# Patient Record
Sex: MALE | Race: WHITE | NOT HISPANIC OR LATINO | Employment: UNEMPLOYED | URBAN - NONMETROPOLITAN AREA
[De-identification: names, ages, dates, MRNs, and addresses within clinical notes are randomized per-mention and may not be internally consistent; named-entity substitution may affect disease eponyms.]

---

## 2021-06-03 ENCOUNTER — APPOINTMENT (OUTPATIENT)
Dept: GENERAL RADIOLOGY | Facility: HOSPITAL | Age: 51
End: 2021-06-03

## 2021-06-03 ENCOUNTER — HOSPITAL ENCOUNTER (INPATIENT)
Facility: HOSPITAL | Age: 51
LOS: 3 days | Discharge: HOME | End: 2021-06-06
Attending: FAMILY MEDICINE | Admitting: INTERNAL MEDICINE

## 2021-06-03 DIAGNOSIS — I21.11 ST ELEVATION MYOCARDIAL INFARCTION INVOLVING RIGHT CORONARY ARTERY (HCC): ICD-10-CM

## 2021-06-03 DIAGNOSIS — I21.3 ST ELEVATION MYOCARDIAL INFARCTION (STEMI), UNSPECIFIED ARTERY (HCC): Primary | ICD-10-CM

## 2021-06-03 PROBLEM — I44.2 COMPLETE HEART BLOCK (HCC): Status: ACTIVE | Noted: 2021-06-03

## 2021-06-03 PROBLEM — Z72.0 TOBACCO ABUSE: Status: ACTIVE | Noted: 2021-06-03

## 2021-06-03 PROBLEM — I21.19 ACUTE ST ELEVATION MYOCARDIAL INFARCTION (STEMI) OF INFERIOR WALL: Status: ACTIVE | Noted: 2021-06-03

## 2021-06-03 LAB
ALBUMIN SERPL-MCNC: 3.8 G/DL (ref 3.5–5.2)
ALBUMIN/GLOB SERPL: 1.2 G/DL
ALP SERPL-CCNC: 88 U/L (ref 39–117)
ALT SERPL W P-5'-P-CCNC: 77 U/L (ref 1–41)
ANION GAP SERPL CALCULATED.3IONS-SCNC: 11 MMOL/L (ref 5–15)
AST SERPL-CCNC: 219 U/L (ref 1–40)
BASOPHILS # BLD AUTO: 0.13 10*3/MM3 (ref 0–0.2)
BASOPHILS NFR BLD AUTO: 0.8 % (ref 0–1.5)
BILIRUB SERPL-MCNC: 0.7 MG/DL (ref 0–1.2)
BUN SERPL-MCNC: 14 MG/DL (ref 6–20)
BUN/CREAT SERPL: 11.5 (ref 7–25)
CALCIUM SPEC-SCNC: 9.2 MG/DL (ref 8.6–10.5)
CHLORIDE SERPL-SCNC: 100 MMOL/L (ref 98–107)
CO2 SERPL-SCNC: 25 MMOL/L (ref 22–29)
CREAT SERPL-MCNC: 1.22 MG/DL (ref 0.76–1.27)
DEPRECATED RDW RBC AUTO: 40.1 FL (ref 37–54)
EOSINOPHIL # BLD AUTO: 0.07 10*3/MM3 (ref 0–0.4)
EOSINOPHIL NFR BLD AUTO: 0.4 % (ref 0.3–6.2)
ERYTHROCYTE [DISTWIDTH] IN BLOOD BY AUTOMATED COUNT: 12.1 % (ref 12.3–15.4)
GFR SERPL CREATININE-BSD FRML MDRD: 63 ML/MIN/1.73
GLOBULIN UR ELPH-MCNC: 3.3 GM/DL
GLUCOSE SERPL-MCNC: 91 MG/DL (ref 65–99)
HCT VFR BLD AUTO: 42.1 % (ref 37.5–51)
HGB BLD-MCNC: 14.2 G/DL (ref 13–17.7)
HOLD SPECIMEN: NORMAL
IMM GRANULOCYTES # BLD AUTO: 0.07 10*3/MM3 (ref 0–0.05)
IMM GRANULOCYTES NFR BLD AUTO: 0.4 % (ref 0–0.5)
INR PPP: 1.3 (ref 0.91–1.09)
LYMPHOCYTES # BLD AUTO: 3.8 10*3/MM3 (ref 0.7–3.1)
LYMPHOCYTES NFR BLD AUTO: 23.3 % (ref 19.6–45.3)
MAGNESIUM SERPL-MCNC: 1.9 MG/DL (ref 1.6–2.6)
MCH RBC QN AUTO: 30.7 PG (ref 26.6–33)
MCHC RBC AUTO-ENTMCNC: 33.7 G/DL (ref 31.5–35.7)
MCV RBC AUTO: 90.9 FL (ref 79–97)
MONOCYTES # BLD AUTO: 1.58 10*3/MM3 (ref 0.1–0.9)
MONOCYTES NFR BLD AUTO: 9.7 % (ref 5–12)
NEUTROPHILS NFR BLD AUTO: 10.64 10*3/MM3 (ref 1.7–7)
NEUTROPHILS NFR BLD AUTO: 65.4 % (ref 42.7–76)
NRBC BLD AUTO-RTO: 0 /100 WBC (ref 0–0.2)
PLATELET # BLD AUTO: 200 10*3/MM3 (ref 140–450)
PMV BLD AUTO: 11.2 FL (ref 6–12)
POTASSIUM SERPL-SCNC: 3.9 MMOL/L (ref 3.5–5.2)
PROT SERPL-MCNC: 7.1 G/DL (ref 6–8.5)
PROTHROMBIN TIME: 15.2 SECONDS (ref 11.5–13.4)
RBC # BLD AUTO: 4.63 10*6/MM3 (ref 4.14–5.8)
SODIUM SERPL-SCNC: 136 MMOL/L (ref 136–145)
TROPONIN T SERPL-MCNC: 6.13 NG/ML (ref 0–0.03)
TROPONIN T SERPL-MCNC: 8.32 NG/ML (ref 0–0.03)
WBC # BLD AUTO: 16.29 10*3/MM3 (ref 3.4–10.8)
WHOLE BLOOD HOLD SPECIMEN: NORMAL

## 2021-06-03 PROCEDURE — 84484 ASSAY OF TROPONIN QUANT: CPT | Performed by: FAMILY MEDICINE

## 2021-06-03 PROCEDURE — 25010000002 HEPARIN (PORCINE) 2000-0.9 UNIT/L-% SOLUTION: Performed by: INTERNAL MEDICINE

## 2021-06-03 PROCEDURE — 99284 EMERGENCY DEPT VISIT MOD MDM: CPT

## 2021-06-03 PROCEDURE — 99152 MOD SED SAME PHYS/QHP 5/>YRS: CPT | Performed by: INTERNAL MEDICINE

## 2021-06-03 PROCEDURE — 93005 ELECTROCARDIOGRAM TRACING: CPT | Performed by: FAMILY MEDICINE

## 2021-06-03 PROCEDURE — B2151ZZ FLUOROSCOPY OF LEFT HEART USING LOW OSMOLAR CONTRAST: ICD-10-PCS | Performed by: INTERNAL MEDICINE

## 2021-06-03 PROCEDURE — C9606 PERC D-E COR REVASC W AMI S: HCPCS | Performed by: INTERNAL MEDICINE

## 2021-06-03 PROCEDURE — C1894 INTRO/SHEATH, NON-LASER: HCPCS | Performed by: INTERNAL MEDICINE

## 2021-06-03 PROCEDURE — C1769 GUIDE WIRE: HCPCS | Performed by: INTERNAL MEDICINE

## 2021-06-03 PROCEDURE — 3E07317 INTRODUCTION OF OTHER THROMBOLYTIC INTO CORONARY ARTERY, PERCUTANEOUS APPROACH: ICD-10-PCS | Performed by: INTERNAL MEDICINE

## 2021-06-03 PROCEDURE — 25010000002: Performed by: INTERNAL MEDICINE

## 2021-06-03 PROCEDURE — 93458 L HRT ARTERY/VENTRICLE ANGIO: CPT | Performed by: INTERNAL MEDICINE

## 2021-06-03 PROCEDURE — 25010000002 BIVALIRUDIN TRIFLUOROACETATE 250 MG RECONSTITUTED SOLUTION: Performed by: INTERNAL MEDICINE

## 2021-06-03 PROCEDURE — C1757 CATH, THROMBECTOMY/EMBOLECT: HCPCS | Performed by: INTERNAL MEDICINE

## 2021-06-03 PROCEDURE — 25010000002 DIPHENHYDRAMINE PER 50 MG: Performed by: INTERNAL MEDICINE

## 2021-06-03 PROCEDURE — C1874 STENT, COATED/COV W/DEL SYS: HCPCS | Performed by: INTERNAL MEDICINE

## 2021-06-03 PROCEDURE — 93010 ELECTROCARDIOGRAM REPORT: CPT | Performed by: INTERNAL MEDICINE

## 2021-06-03 PROCEDURE — C1725 CATH, TRANSLUMIN NON-LASER: HCPCS | Performed by: INTERNAL MEDICINE

## 2021-06-03 PROCEDURE — 25010000002 FENTANYL CITRATE (PF) 100 MCG/2ML SOLUTION: Performed by: INTERNAL MEDICINE

## 2021-06-03 PROCEDURE — 4A023N7 MEASUREMENT OF CARDIAC SAMPLING AND PRESSURE, LEFT HEART, PERCUTANEOUS APPROACH: ICD-10-PCS | Performed by: INTERNAL MEDICINE

## 2021-06-03 PROCEDURE — 36415 COLL VENOUS BLD VENIPUNCTURE: CPT | Performed by: FAMILY MEDICINE

## 2021-06-03 PROCEDURE — 80053 COMPREHEN METABOLIC PANEL: CPT | Performed by: FAMILY MEDICINE

## 2021-06-03 PROCEDURE — 71045 X-RAY EXAM CHEST 1 VIEW: CPT

## 2021-06-03 PROCEDURE — 93005 ELECTROCARDIOGRAM TRACING: CPT | Performed by: INTERNAL MEDICINE

## 2021-06-03 PROCEDURE — C1887 CATHETER, GUIDING: HCPCS | Performed by: INTERNAL MEDICINE

## 2021-06-03 PROCEDURE — 02C03ZZ EXTIRPATION OF MATTER FROM CORONARY ARTERY, ONE ARTERY, PERCUTANEOUS APPROACH: ICD-10-PCS | Performed by: INTERNAL MEDICINE

## 2021-06-03 PROCEDURE — 85610 PROTHROMBIN TIME: CPT | Performed by: FAMILY MEDICINE

## 2021-06-03 PROCEDURE — 84484 ASSAY OF TROPONIN QUANT: CPT | Performed by: INTERNAL MEDICINE

## 2021-06-03 PROCEDURE — 99223 1ST HOSP IP/OBS HIGH 75: CPT | Performed by: INTERNAL MEDICINE

## 2021-06-03 PROCEDURE — 027037Z DILATION OF CORONARY ARTERY, ONE ARTERY WITH FOUR OR MORE DRUG-ELUTING INTRALUMINAL DEVICES, PERCUTANEOUS APPROACH: ICD-10-PCS | Performed by: INTERNAL MEDICINE

## 2021-06-03 PROCEDURE — 99153 MOD SED SAME PHYS/QHP EA: CPT | Performed by: INTERNAL MEDICINE

## 2021-06-03 PROCEDURE — 85025 COMPLETE CBC W/AUTO DIFF WBC: CPT | Performed by: FAMILY MEDICINE

## 2021-06-03 PROCEDURE — 92941 PRQ TRLML REVSC TOT OCCL AMI: CPT | Performed by: INTERNAL MEDICINE

## 2021-06-03 PROCEDURE — 25010000002 MIDAZOLAM HCL (PF) 5 MG/5ML SOLUTION: Performed by: INTERNAL MEDICINE

## 2021-06-03 PROCEDURE — 83735 ASSAY OF MAGNESIUM: CPT | Performed by: FAMILY MEDICINE

## 2021-06-03 PROCEDURE — B2111ZZ FLUOROSCOPY OF MULTIPLE CORONARY ARTERIES USING LOW OSMOLAR CONTRAST: ICD-10-PCS | Performed by: INTERNAL MEDICINE

## 2021-06-03 PROCEDURE — 0 IOPAMIDOL PER 1 ML: Performed by: INTERNAL MEDICINE

## 2021-06-03 PROCEDURE — C1760 CLOSURE DEV, VASC: HCPCS | Performed by: INTERNAL MEDICINE

## 2021-06-03 PROCEDURE — 25010000002 HEPARIN (PORCINE) 1000-0.9 UT/500ML-% SOLUTION: Performed by: INTERNAL MEDICINE

## 2021-06-03 DEVICE — XIENCE SIERRA™ EVEROLIMUS ELUTING CORONARY STENT SYSTEM 3.00 MM X 23 MM / RAPID-EXCHANGE
Type: IMPLANTABLE DEVICE | Status: FUNCTIONAL
Brand: XIENCE SIERRA™

## 2021-06-03 DEVICE — XIENCE SIERRA™ EVEROLIMUS ELUTING CORONARY STENT SYSTEM 3.00 MM X 28 MM / RAPID-EXCHANGE
Type: IMPLANTABLE DEVICE | Site: CORONARY | Status: FUNCTIONAL
Brand: XIENCE SIERRA™

## 2021-06-03 DEVICE — XIENCE SIERRA™ EVEROLIMUS ELUTING CORONARY STENT SYSTEM 3.25 MM X 23 MM / RAPID-EXCHANGE
Type: IMPLANTABLE DEVICE | Site: CORONARY | Status: FUNCTIONAL
Brand: XIENCE SIERRA™

## 2021-06-03 RX ORDER — DIPHENHYDRAMINE HYDROCHLORIDE 50 MG/ML
INJECTION INTRAMUSCULAR; INTRAVENOUS AS NEEDED
Status: DISCONTINUED | OUTPATIENT
Start: 2021-06-03 | End: 2021-06-03 | Stop reason: HOSPADM

## 2021-06-03 RX ORDER — ATORVASTATIN CALCIUM 40 MG/1
40 TABLET, FILM COATED ORAL NIGHTLY
Status: DISCONTINUED | OUTPATIENT
Start: 2021-06-03 | End: 2021-06-06 | Stop reason: HOSPADM

## 2021-06-03 RX ORDER — SODIUM CHLORIDE 9 MG/ML
100 INJECTION, SOLUTION INTRAVENOUS CONTINUOUS
Status: DISPENSED | OUTPATIENT
Start: 2021-06-03 | End: 2021-06-04

## 2021-06-03 RX ORDER — NICOTINE 21 MG/24HR
1 PATCH, TRANSDERMAL 24 HOURS TRANSDERMAL
Status: DISCONTINUED | OUTPATIENT
Start: 2021-06-03 | End: 2021-06-06 | Stop reason: HOSPADM

## 2021-06-03 RX ORDER — FENTANYL CITRATE 50 UG/ML
INJECTION, SOLUTION INTRAMUSCULAR; INTRAVENOUS AS NEEDED
Status: DISCONTINUED | OUTPATIENT
Start: 2021-06-03 | End: 2021-06-03 | Stop reason: HOSPADM

## 2021-06-03 RX ORDER — ACETAMINOPHEN 325 MG/1
650 TABLET ORAL EVERY 4 HOURS PRN
Status: DISCONTINUED | OUTPATIENT
Start: 2021-06-03 | End: 2021-06-06 | Stop reason: HOSPADM

## 2021-06-03 RX ORDER — ASPIRIN 81 MG/1
81 TABLET, CHEWABLE ORAL DAILY
Status: DISCONTINUED | OUTPATIENT
Start: 2021-06-04 | End: 2021-06-06 | Stop reason: HOSPADM

## 2021-06-03 RX ORDER — SODIUM CHLORIDE 0.9 % (FLUSH) 0.9 %
10 SYRINGE (ML) INJECTION AS NEEDED
Status: DISCONTINUED | OUTPATIENT
Start: 2021-06-03 | End: 2021-06-06 | Stop reason: HOSPADM

## 2021-06-03 RX ORDER — NITROGLYCERIN 5 MG/ML
INJECTION, SOLUTION INTRAVENOUS AS NEEDED
Status: DISCONTINUED | OUTPATIENT
Start: 2021-06-03 | End: 2021-06-03 | Stop reason: HOSPADM

## 2021-06-03 RX ORDER — HEPARIN SODIUM 200 [USP'U]/100ML
INJECTION, SOLUTION INTRAVENOUS AS NEEDED
Status: DISCONTINUED | OUTPATIENT
Start: 2021-06-03 | End: 2021-06-03 | Stop reason: HOSPADM

## 2021-06-03 RX ORDER — NITROGLYCERIN 20 MG/100ML
10 INJECTION INTRAVENOUS CONTINUOUS
Status: ACTIVE | OUTPATIENT
Start: 2021-06-03 | End: 2021-06-04

## 2021-06-03 RX ORDER — MIDAZOLAM HYDROCHLORIDE 1 MG/ML
INJECTION, SOLUTION INTRAMUSCULAR; INTRAVENOUS AS NEEDED
Status: DISCONTINUED | OUTPATIENT
Start: 2021-06-03 | End: 2021-06-03 | Stop reason: HOSPADM

## 2021-06-03 RX ORDER — LIDOCAINE HYDROCHLORIDE 20 MG/ML
INJECTION, SOLUTION INFILTRATION; PERINEURAL AS NEEDED
Status: DISCONTINUED | OUTPATIENT
Start: 2021-06-03 | End: 2021-06-03 | Stop reason: HOSPADM

## 2021-06-03 RX ADMIN — SODIUM CHLORIDE 100 ML/HR: 9 INJECTION, SOLUTION INTRAVENOUS at 22:18

## 2021-06-03 RX ADMIN — NITROGLYCERIN 10 MCG/MIN: 20 INJECTION INTRAVENOUS at 22:16

## 2021-06-03 RX ADMIN — NICOTINE 1 PATCH: 21 PATCH, EXTENDED RELEASE TRANSDERMAL at 23:07

## 2021-06-03 RX ADMIN — ATORVASTATIN CALCIUM 40 MG: 40 TABLET, FILM COATED ORAL at 23:08

## 2021-06-04 ENCOUNTER — APPOINTMENT (OUTPATIENT)
Dept: GENERAL RADIOLOGY | Facility: HOSPITAL | Age: 51
End: 2021-06-04

## 2021-06-04 ENCOUNTER — APPOINTMENT (OUTPATIENT)
Dept: CARDIOLOGY | Facility: HOSPITAL | Age: 51
End: 2021-06-04

## 2021-06-04 LAB
ANION GAP SERPL CALCULATED.3IONS-SCNC: 10 MMOL/L (ref 5–15)
BH CV ECHO MEAS - AO MAX PG (FULL): 1.5 MMHG
BH CV ECHO MEAS - AO MAX PG: 4 MMHG
BH CV ECHO MEAS - AO MEAN PG (FULL): 1 MMHG
BH CV ECHO MEAS - AO MEAN PG: 2 MMHG
BH CV ECHO MEAS - AO ROOT AREA (BSA CORRECTED): 1.7
BH CV ECHO MEAS - AO ROOT AREA: 10.2 CM^2
BH CV ECHO MEAS - AO ROOT DIAM: 3.6 CM
BH CV ECHO MEAS - AO V2 MAX: 100 CM/SEC
BH CV ECHO MEAS - AO V2 MEAN: 73 CM/SEC
BH CV ECHO MEAS - AO V2 VTI: 15.8 CM
BH CV ECHO MEAS - AVA(I,A): 3.5 CM^2
BH CV ECHO MEAS - AVA(I,D): 3.5 CM^2
BH CV ECHO MEAS - AVA(V,A): 3.5 CM^2
BH CV ECHO MEAS - AVA(V,D): 3.5 CM^2
BH CV ECHO MEAS - BSA(HAYCOCK): 2.3 M^2
BH CV ECHO MEAS - BSA: 2.2 M^2
BH CV ECHO MEAS - BZI_BMI: 32.9 KILOGRAMS/M^2
BH CV ECHO MEAS - BZI_METRIC_HEIGHT: 175.3 CM
BH CV ECHO MEAS - BZI_METRIC_WEIGHT: 101.2 KG
BH CV ECHO MEAS - EDV(CUBED): 138.2 ML
BH CV ECHO MEAS - EDV(MOD-SP4): 144 ML
BH CV ECHO MEAS - EDV(TEICH): 127.8 ML
BH CV ECHO MEAS - EF(CUBED): 65.7 %
BH CV ECHO MEAS - EF(MOD-SP4): 54 %
BH CV ECHO MEAS - EF(TEICH): 56.8 %
BH CV ECHO MEAS - ESV(CUBED): 47.4 ML
BH CV ECHO MEAS - ESV(MOD-SP4): 66.3 ML
BH CV ECHO MEAS - ESV(TEICH): 55.2 ML
BH CV ECHO MEAS - FS: 30 %
BH CV ECHO MEAS - IVS/LVPW: 1.3
BH CV ECHO MEAS - IVSD: 1.7 CM
BH CV ECHO MEAS - LA DIMENSION: 3.6 CM
BH CV ECHO MEAS - LA/AO: 1
BH CV ECHO MEAS - LAT PEAK E' VEL: 9.4 CM/SEC
BH CV ECHO MEAS - LV DIASTOLIC VOL/BSA (35-75): 66.6 ML/M^2
BH CV ECHO MEAS - LV MASS(C)D: 326.2 GRAMS
BH CV ECHO MEAS - LV MASS(C)DI: 150.8 GRAMS/M^2
BH CV ECHO MEAS - LV MAX PG: 2.5 MMHG
BH CV ECHO MEAS - LV MEAN PG: 1 MMHG
BH CV ECHO MEAS - LV SYSTOLIC VOL/BSA (12-30): 30.6 ML/M^2
BH CV ECHO MEAS - LV V1 MAX: 78.3 CM/SEC
BH CV ECHO MEAS - LV V1 MEAN: 53.8 CM/SEC
BH CV ECHO MEAS - LV V1 VTI: 12.1 CM
BH CV ECHO MEAS - LVIDD: 5.2 CM
BH CV ECHO MEAS - LVIDS: 3.6 CM
BH CV ECHO MEAS - LVLD AP4: 8.5 CM
BH CV ECHO MEAS - LVLS AP4: 7.3 CM
BH CV ECHO MEAS - LVOT AREA (M): 4.5 CM^2
BH CV ECHO MEAS - LVOT AREA: 4.5 CM^2
BH CV ECHO MEAS - LVOT DIAM: 2.4 CM
BH CV ECHO MEAS - LVPWD: 1.3 CM
BH CV ECHO MEAS - MED PEAK E' VEL: 7.51 CM/SEC
BH CV ECHO MEAS - MV A MAX VEL: 51.4 CM/SEC
BH CV ECHO MEAS - MV DEC TIME: 0.29 SEC
BH CV ECHO MEAS - MV E MAX VEL: 70.8 CM/SEC
BH CV ECHO MEAS - MV E/A: 1.4
BH CV ECHO MEAS - PA MAX PG: 1.3 MMHG
BH CV ECHO MEAS - PA V2 MAX: 57.2 CM/SEC
BH CV ECHO MEAS - RAP SYSTOLE: 5 MMHG
BH CV ECHO MEAS - RVSP: 24.5 MMHG
BH CV ECHO MEAS - SI(AO): 74.3 ML/M^2
BH CV ECHO MEAS - SI(CUBED): 41.9 ML/M^2
BH CV ECHO MEAS - SI(LVOT): 25.3 ML/M^2
BH CV ECHO MEAS - SI(MOD-SP4): 35.9 ML/M^2
BH CV ECHO MEAS - SI(TEICH): 33.6 ML/M^2
BH CV ECHO MEAS - SV(AO): 160.8 ML
BH CV ECHO MEAS - SV(CUBED): 90.8 ML
BH CV ECHO MEAS - SV(LVOT): 54.7 ML
BH CV ECHO MEAS - SV(MOD-SP4): 77.7 ML
BH CV ECHO MEAS - SV(TEICH): 72.6 ML
BH CV ECHO MEAS - TR MAX VEL: 221 CM/SEC
BH CV ECHO MEASUREMENTS AVERAGE E/E' RATIO: 8.37
BILIRUB UR QL STRIP: NEGATIVE
BUN SERPL-MCNC: 18 MG/DL (ref 6–20)
BUN/CREAT SERPL: 17.3 (ref 7–25)
CALCIUM SPEC-SCNC: 8.4 MG/DL (ref 8.6–10.5)
CHLORIDE SERPL-SCNC: 102 MMOL/L (ref 98–107)
CHOLEST SERPL-MCNC: 102 MG/DL (ref 0–200)
CLARITY UR: CLEAR
CO2 SERPL-SCNC: 22 MMOL/L (ref 22–29)
COLOR UR: ABNORMAL
CREAT SERPL-MCNC: 1.04 MG/DL (ref 0.76–1.27)
DEPRECATED RDW RBC AUTO: 39.5 FL (ref 37–54)
ERYTHROCYTE [DISTWIDTH] IN BLOOD BY AUTOMATED COUNT: 11.9 % (ref 12.3–15.4)
GFR SERPL CREATININE-BSD FRML MDRD: 76 ML/MIN/1.73
GLUCOSE SERPL-MCNC: 111 MG/DL (ref 65–99)
GLUCOSE UR STRIP-MCNC: NEGATIVE MG/DL
HBA1C MFR BLD: 5.5 % (ref 4.8–5.6)
HCT VFR BLD AUTO: 35 % (ref 37.5–51)
HDLC SERPL-MCNC: 33 MG/DL (ref 40–60)
HGB BLD-MCNC: 12.1 G/DL (ref 13–17.7)
HGB UR QL STRIP.AUTO: NEGATIVE
KETONES UR QL STRIP: ABNORMAL
LDLC SERPL CALC-MCNC: 53 MG/DL (ref 0–100)
LDLC/HDLC SERPL: 1.6 {RATIO}
LEFT ATRIUM VOLUME INDEX: 19.6 ML/M2
LEFT ATRIUM VOLUME: 42.4 CM3
LEUKOCYTE ESTERASE UR QL STRIP.AUTO: NEGATIVE
MAXIMAL PREDICTED HEART RATE: 171 BPM
MCH RBC QN AUTO: 30.8 PG (ref 26.6–33)
MCHC RBC AUTO-ENTMCNC: 34.6 G/DL (ref 31.5–35.7)
MCV RBC AUTO: 89.1 FL (ref 79–97)
NITRITE UR QL STRIP: NEGATIVE
PH UR STRIP.AUTO: 5.5 [PH] (ref 5–8)
PLATELET # BLD AUTO: 194 10*3/MM3 (ref 140–450)
PMV BLD AUTO: 11.3 FL (ref 6–12)
POTASSIUM SERPL-SCNC: 3.8 MMOL/L (ref 3.5–5.2)
PROT UR QL STRIP: ABNORMAL
RBC # BLD AUTO: 3.93 10*6/MM3 (ref 4.14–5.8)
SODIUM SERPL-SCNC: 134 MMOL/L (ref 136–145)
SP GR UR STRIP: >1.03 (ref 1–1.03)
STRESS TARGET HR: 145 BPM
TRIGL SERPL-MCNC: 81 MG/DL (ref 0–150)
TROPONIN T SERPL-MCNC: 7.62 NG/ML (ref 0–0.03)
UROBILINOGEN UR QL STRIP: ABNORMAL
VLDLC SERPL-MCNC: 16 MG/DL (ref 5–40)
WBC # BLD AUTO: 13.71 10*3/MM3 (ref 3.4–10.8)

## 2021-06-04 PROCEDURE — 93010 ELECTROCARDIOGRAM REPORT: CPT | Performed by: INTERNAL MEDICINE

## 2021-06-04 PROCEDURE — 71045 X-RAY EXAM CHEST 1 VIEW: CPT

## 2021-06-04 PROCEDURE — 87040 BLOOD CULTURE FOR BACTERIA: CPT | Performed by: INTERNAL MEDICINE

## 2021-06-04 PROCEDURE — 99232 SBSQ HOSP IP/OBS MODERATE 35: CPT | Performed by: INTERNAL MEDICINE

## 2021-06-04 PROCEDURE — 80061 LIPID PANEL: CPT | Performed by: INTERNAL MEDICINE

## 2021-06-04 PROCEDURE — 84484 ASSAY OF TROPONIN QUANT: CPT | Performed by: INTERNAL MEDICINE

## 2021-06-04 PROCEDURE — 93306 TTE W/DOPPLER COMPLETE: CPT

## 2021-06-04 PROCEDURE — 85027 COMPLETE CBC AUTOMATED: CPT | Performed by: INTERNAL MEDICINE

## 2021-06-04 PROCEDURE — 93306 TTE W/DOPPLER COMPLETE: CPT | Performed by: INTERNAL MEDICINE

## 2021-06-04 PROCEDURE — 83036 HEMOGLOBIN GLYCOSYLATED A1C: CPT | Performed by: INTERNAL MEDICINE

## 2021-06-04 PROCEDURE — 81003 URINALYSIS AUTO W/O SCOPE: CPT | Performed by: INTERNAL MEDICINE

## 2021-06-04 PROCEDURE — 93005 ELECTROCARDIOGRAM TRACING: CPT | Performed by: INTERNAL MEDICINE

## 2021-06-04 PROCEDURE — 25010000002 ENOXAPARIN PER 10 MG: Performed by: INTERNAL MEDICINE

## 2021-06-04 PROCEDURE — 80048 BASIC METABOLIC PNL TOTAL CA: CPT | Performed by: INTERNAL MEDICINE

## 2021-06-04 PROCEDURE — 25010000002 PERFLUTREN 6.52 MG/ML SUSPENSION: Performed by: INTERNAL MEDICINE

## 2021-06-04 RX ORDER — PANTOPRAZOLE SODIUM 40 MG/1
40 TABLET, DELAYED RELEASE ORAL
Status: DISCONTINUED | OUTPATIENT
Start: 2021-06-04 | End: 2021-06-06 | Stop reason: HOSPADM

## 2021-06-04 RX ORDER — LISINOPRIL 2.5 MG/1
2.5 TABLET ORAL
Status: DISCONTINUED | OUTPATIENT
Start: 2021-06-05 | End: 2021-06-06 | Stop reason: HOSPADM

## 2021-06-04 RX ADMIN — ENOXAPARIN SODIUM 100 MG: 100 INJECTION SUBCUTANEOUS at 21:50

## 2021-06-04 RX ADMIN — ACETAMINOPHEN 650 MG: 325 TABLET, FILM COATED ORAL at 20:04

## 2021-06-04 RX ADMIN — ASPIRIN 81 MG: 81 TABLET, CHEWABLE ORAL at 09:36

## 2021-06-04 RX ADMIN — PERFLUTREN 9.78 MG: 6.52 INJECTION, SUSPENSION INTRAVENOUS at 15:46

## 2021-06-04 RX ADMIN — PANTOPRAZOLE SODIUM 40 MG: 40 TABLET, DELAYED RELEASE ORAL at 17:24

## 2021-06-04 RX ADMIN — ENOXAPARIN SODIUM 100 MG: 100 INJECTION SUBCUTANEOUS at 11:03

## 2021-06-04 RX ADMIN — NICOTINE 1 PATCH: 21 PATCH, EXTENDED RELEASE TRANSDERMAL at 21:51

## 2021-06-04 RX ADMIN — TICAGRELOR 90 MG: 90 TABLET ORAL at 09:36

## 2021-06-04 RX ADMIN — ATORVASTATIN CALCIUM 40 MG: 40 TABLET, FILM COATED ORAL at 21:50

## 2021-06-04 RX ADMIN — ACETAMINOPHEN 650 MG: 325 TABLET, FILM COATED ORAL at 12:54

## 2021-06-04 RX ADMIN — ACETAMINOPHEN 650 MG: 325 TABLET, FILM COATED ORAL at 04:12

## 2021-06-04 RX ADMIN — TICAGRELOR 90 MG: 90 TABLET ORAL at 21:50

## 2021-06-04 NOTE — PROGRESS NOTES
"    RE:  Pipo Bentley  :  1970    CC: chest pain         Subjective: Patient admitted overnight with an inferior STEMI.  Patient was a delayed presentation with constant chest pain for approximately 2 days.  He underwent PCI to his RCA with multiple drug-eluting stents.  He had fevers overnight.  He was and had urine and blood cultures drawn.  He remains on nitroglycerin drip to help maintain perfusion distally.  He denies any current chest pain.  He has been noted to have Mobitz 1 AV block on telemetry and EKG.    ROS: Pertinent positives and negatives listed above.  All other systems reviewed and negative.    Objective:   /96   Pulse 74   Temp 98.7 °F (37.1 °C) (Oral)   Resp 14   Ht 175.3 cm (69\")   Wt 101 kg (223 lb 1.7 oz)   SpO2 99%   BMI 32.95 kg/m²   Temp:  [97.5 °F (36.4 °C)-102.9 °F (39.4 °C)] 98.7 °F (37.1 °C)  Heart Rate:  [73-94] 74  Resp:  [12-27] 14  BP: ()/() 121/96    Intake/Output Summary (Last 24 hours) at 2021 1640  Last data filed at 2021 1200  Gross per 24 hour   Intake 1430.57 ml   Output 1025 ml   Net 405.57 ml       Current Facility-Administered Medications:   •  acetaminophen (TYLENOL) tablet 650 mg, 650 mg, Oral, Q4H PRN, Uli Holland MD, 650 mg at 21 1254  •  aspirin chewable tablet 81 mg, 81 mg, Oral, Daily, Uli Holland MD, 81 mg at 21 0936  •  atorvastatin (LIPITOR) tablet 40 mg, 40 mg, Oral, Nightly, Uli Holland MD, 40 mg at 21 2308  •  enoxaparin (LOVENOX) syringe 100 mg, 1 mg/kg, Subcutaneous, Q12H, Uli Holland MD, 100 mg at 21 1103  •  [START ON 2021] lisinopril (PRINIVIL,ZESTRIL) tablet 2.5 mg, 2.5 mg, Oral, Q24H, Geraldo Kumari MD  •  nicotine (NICODERM CQ) 21 MG/24HR patch 1 patch, 1 patch, Transdermal, Q24H, Uli Holland MD, 1 patch at 21 2307  •  nitroglycerin (TRIDIL) 200 mcg/ml infusion, 10 mcg/min, Intravenous, Continuous, Uli Holland MD, Last Rate: 3 mL/hr at 21 2216, " 10 mcg/min at 06/03/21 2216  •  pantoprazole (PROTONIX) EC tablet 40 mg, 40 mg, Oral, Q AM, Geraldo Kumari MD  •  sodium chloride 0.9 % flush 10 mL, 10 mL, Intravenous, PRN, Uli Holland MD  •  sodium chloride 0.9 % flush 10 mL, 10 mL, Intravenous, PRN, Uli Holland MD  •  ticagrelor (BRILINTA) tablet 90 mg, 90 mg, Oral, BID, Uli Holland MD, 90 mg at 06/04/21 0936    Physical Exam:   Gen: Alert and oriented x3, no acute distress  Heart: Regular rate and rhythm, no murmurs, rubs, or gallops  Chest: Lungs clear to auscultation bilaterally, normal respiratory effort  Abd: Soft, non tender, bowel sounds are positive  Ext: No clubbing, cyanosis, or edema     Lab Results (last 24 hours)     Procedure Component Value Units Date/Time    Urinalysis With Culture If Indicated - Urine, Clean Catch [[id number]]  (Abnormal) Collected: 06/04/21 0549    Specimen: Urine, Clean Catch Updated: 06/04/21 0616     Color, UA Dark Yellow     Appearance, UA Clear     pH, UA 5.5     Specific Gravity, UA >1.030     Glucose, UA Negative     Ketones, UA 15 mg/dL (1+)     Bilirubin, UA Negative     Blood, UA Negative     Protein, UA Trace     Leuk Esterase, UA Negative     Nitrite, UA Negative     Urobilinogen, UA 1.0 E.U./dL    Narrative:      Urine microscopic not indicated.    Hemoglobin A1c [[id number]]  (Normal) Collected: 06/04/21 0442    Specimen: Blood Updated: 06/04/21 0552     Hemoglobin A1C 5.50 %     Narrative:      Hemoglobin A1C Ranges:    Increased Risk for Diabetes  5.7% to 6.4%  Diabetes                     >= 6.5%  Diabetic Goal                < 7.0%    Troponin [[id number]]  (Abnormal) Collected: 06/04/21 0442    Specimen: Blood Updated: 06/04/21 0543     Troponin T 7.620 ng/mL     Narrative:      Troponin T Reference Range:  <= 0.03 ng/mL-   Negative for AMI  >0.03 ng/mL-     Abnormal for myocardial necrosis.  Clinicians would have to utilize clinical acumen, EKG, Troponin and serial changes to determine if it is  an Acute Myocardial Infarction or myocardial injury due to an underlying chronic condition.       Results may be falsely decreased if patient taking Biotin.      Basic Metabolic Panel [[id number]]  (Abnormal) Collected: 06/04/21 0442    Specimen: Blood Updated: 06/04/21 0539     Glucose 111 mg/dL      BUN 18 mg/dL      Creatinine 1.04 mg/dL      Sodium 134 mmol/L      Potassium 3.8 mmol/L      Chloride 102 mmol/L      CO2 22.0 mmol/L      Calcium 8.4 mg/dL      eGFR Non African Amer 76 mL/min/1.73      BUN/Creatinine Ratio 17.3     Anion Gap 10.0 mmol/L     Narrative:      GFR Normal >60  Chronic Kidney Disease <60  Kidney Failure <15      Lipid Panel [[id number]]  (Abnormal) Collected: 06/04/21 0442    Specimen: Blood Updated: 06/04/21 0539     Total Cholesterol 102 mg/dL      Triglycerides 81 mg/dL      HDL Cholesterol 33 mg/dL      LDL Cholesterol  53 mg/dL      VLDL Cholesterol 16 mg/dL      LDL/HDL Ratio 1.60    Narrative:      Cholesterol Reference Ranges  (U.S. Department of Health and Human Services ATP III Classifications)    Desirable          <200 mg/dL  Borderline High    200-239 mg/dL  High Risk          >240 mg/dL      Triglyceride Reference Ranges  (U.S. Department of Health and Human Services ATP III Classifications)    Normal           <150 mg/dL  Borderline High  150-199 mg/dL  High             200-499 mg/dL  Very High        >500 mg/dL    HDL Reference Ranges  (U.S. Department of Health and Human Services ATP III Classifcations)    Low     <40 mg/dl (major risk factor for CHD)  High    >60 mg/dl ('negative' risk factor for CHD)        LDL Reference Ranges  (U.S. Department of Health and Human Services ATP III Classifcations)    Optimal          <100 mg/dL  Near Optimal     100-129 mg/dL  Borderline High  130-159 mg/dL  High             160-189 mg/dL  Very High        >189 mg/dL    CBC (No Diff) [[id number]]  (Abnormal) Collected: 06/04/21 0442    Specimen: Blood Updated: 06/04/21 0520     WBC 13.71  10*3/mm3      RBC 3.93 10*6/mm3      Hemoglobin 12.1 g/dL      Hematocrit 35.0 %      MCV 89.1 fL      MCH 30.8 pg      MCHC 34.6 g/dL      RDW 11.9 %      RDW-SD 39.5 fl      MPV 11.3 fL      Platelets 194 10*3/mm3     Blood Culture - Blood, Hand, Left [[id number]] Collected: 06/04/21 0442    Specimen: Blood from Hand, Left Updated: 06/04/21 0516    Blood Culture - Blood, Hand, Right [[id number]] Collected: 06/04/21 0442    Specimen: Blood from Hand, Right Updated: 06/04/21 0515    [place] Draw [[id number]] Collected: 06/03/21 2158    Specimen: Blood Updated: 06/03/21 2301    Narrative:      The following orders were created for panel order [place] Draw.  Procedure                               Abnormality         Status                     ---------                               -----------         ------                     Light Blue Top[[id number]]                                   Final result               Green Top (Gel)[[id number]]                                  Final result               Lavender Top[[id number]]                                     Final result               Red Top[[id number]]                                          Final result                 Please view results for these tests on the individual orders.    Red Top [[id number]] Collected: 06/03/21 2158    Specimen: Blood Updated: 06/03/21 2301     Extra Tube Hold for add-ons.     Comment: Auto resulted.       Light Blue Top [[id number]] Collected: 06/03/21 2159    Specimen: Blood Updated: 06/03/21 2301     Extra Tube hold for add-on     Comment: Auto resulted       Green Top (Gel) [[id number]] Collected: 06/03/21 2158    Specimen: Blood Updated: 06/03/21 2300     Extra Tube Hold for add-ons.     Comment: Auto resulted.       Lavender Top [[id number]] Collected: 06/03/21 2159    Specimen: Blood Updated: 06/03/21 2300     Extra Tube hold for add-on     Comment: Auto resulted       Troponin [[id number]]  (Abnormal) Collected: 06/03/21 2158     Specimen: Blood Updated: 06/03/21 2229     Troponin T 8.320 ng/mL     Narrative:      Troponin T Reference Range:  <= 0.03 ng/mL-   Negative for AMI  >0.03 ng/mL-     Abnormal for myocardial necrosis.  Clinicians would have to utilize clinical acumen, EKG, Troponin and serial changes to determine if it is an Acute Myocardial Infarction or myocardial injury due to an underlying chronic condition.       Results may be falsely decreased if patient taking Biotin.          Imaging Results (Last 24 Hours)     Procedure Component Value Units Date/Time    XR Chest 1 View [[id number]] Collected: 06/04/21 0706     Updated: 06/04/21 0709    Narrative:      EXAMINATION: XR CHEST 1 VW-  6/4/2021 7:06 AM CDT 1 view     HISTORY: Acute STEMI traige protocol; I21.3-ST elevation (STEMI)  myocardial infarction of unspecified site     COMPARISON: None.     FINDINGS:   The heart is at the upper limits of normal in regards to size. Mild  prominence of pulmonary vasculature but no evidence of edema. No  airspace consolidation. No pneumothorax.      The osseous structures and surrounding soft tissues demonstrate no acute  abnormality.          Impression:         1.  The heart is at the upper limits of normal in regards to size and  there is mild prominence of pulmonary vasculature but no evidence of  edema.        This report was finalized on 06/04/2021 07:06 by Dr. Sammy Samaniego MD.    XR Chest 1 View [[id number]] Collected: 06/04/21 0705     Updated: 06/04/21 0708    Narrative:      EXAMINATION: XR CHEST 1 VW-  6/4/2021 7:05 AM CDT 1 view     HISTORY: Fever of unknown origin; I21.3-ST elevation (STEMI) myocardial  infarction of unspecified site     COMPARISON: None.     FINDINGS:   The lungs are clear. The heart is borderline enlarged. No evidence of  pulmonary edema.      The osseous structures and surrounding soft tissues demonstrate no acute  abnormality.          Impression:         1.  Borderline cardiac enlargement. No acute  cardiopulmonary process.        This report was finalized on 06/04/2021 07:05 by Dr. Sammy Samaniego MD.            Assessment:   1.  Acute inferior STEMI: Status post PCI with multiple drug-eluting stents.  2.  Ischemic cardiomyopathy: EF 40-45% with inferior akinesis on ventriculogram.  3.  Tobacco abuse  4.  GERD  5.  Febrile illness: Likely secondary to inflammation from inferior STEMI with delayed presentation.  Blood drawn.  UA unremarkable.  6.  Mobitz 1 second-degree AV block: Likely related to inferior STEMI.    Plan:   -Continue aspirin, Brilinta, and atorvastatin.  -Enoxaparin and nitroglycerin drip for 24 hours following PCI.  -Start lisinopril 2.5 mg daily tomorrow if blood pressure remains stable.  -No beta-blocker at this time due to Mobitz 1 second-degree AV block.  May be able to start prior to discharge.  -Nicotine patch.  -Plan to transfer to telemetry tomorrow.  -Echo pending.

## 2021-06-04 NOTE — H&P
"    P - CARDIOLOGY  HISTORY AND PHYSICAL    Date of Admission: 6/3/2021  Primary Care Physician: No primary care provider on file.    Subjective     Chief Complaint: Chest pain    History of Present Illness    50-year-old male smoker with no other known medical history, but with family history of cardiovascular disease, who was driven to the [facility] emergency department today by his son for chest pain.  He reports he has been having pain almost constantly since Tuesday, 6/1.  He reports it has been been temporized and at times completely alleviated by taking aspirin, but finally he decided today to come get it checked out because it was not getting better.  He describes it as a severe heaviness in the center of the chest with associated dyspnea.  He reports that \"on the way here\" it was better due to some aspirin he is taking, but currently reports pain is coming back in a mild to moderately severe level.  Radiation of the neck as well.  Associated with some nausea as well.  He was given 100 mg of subcu Lovenox at 6:40 PM prior to transfer.    Review of Systems   Otherwise complete ROS reviewed and negative except as mentioned in the HPI.    Past Medical History:   Past Medical History:   Diagnosis Date   • GERD (gastroesophageal reflux disease)    • Hypertension        Past Surgical History:No past surgical history on file.    Family History: family history is not on file.    Social History:  + Tobacco use.  Denies alcohol or other illicit substances.    Medications:  Prior to Admission medications    Medication Sig Start Date End Date Taking? Authorizing Provider   FAMOTIDINE PO Take  by mouth Daily.   Yes Provider, MD Arian     Allergies:  No Known Allergies    Objective     Vital Signs: BP (!) 133/101 (BP Location: Left arm, Patient Position: Sitting)   Pulse 90   Temp 98.3 °F (36.8 °C) (Oral)   Resp 16   Ht 175.3 cm (69\")   Wt 97.5 kg (215 lb)   SpO2 97%   BMI 31.75 kg/m² "     Vitals and nursing note reviewed.   Constitutional:       General: Not in acute distress.     Appearance: Not in distress.   Neck:      Vascular: No JVD or JVR. JVD normal.   Pulmonary:      Effort: Pulmonary effort is normal.      Breath sounds: Normal breath sounds.   Cardiovascular:      Normal rate. Regular rhythm.      Murmurs: There is no murmur.      No gallop. No click.   Pulses:     Intact distal pulses.   Edema:     Peripheral edema absent.   Musculoskeletal:         General: No tenderness. Skin:     General: Skin is warm and dry.   Neurological:      Mental Status: Alert, oriented to person, place, and time and oriented to person, place and time.         Results Reviewed:    I have visualized all the electrocardiograms performed, with my interpretations to follow: Inferior Q waves with ST elevation and T wave inversion, also with T wave inversions in the anterolateral leads V4-V6, also with AV dissociation      Assessment / Plan        Active Hospital Problems    Diagnosis    • Acute ST elevation myocardial infarction (STEMI) of inferior wall (CMS/HCC)    • Tobacco abuse      Recommendations and plans: Given the patient's story and Q waves on EKG, I do believe this to be a delayed presentation of an inferior infarct.  However, he is still complaining of ongoing moderate degree of discomfort, and also appears to have developed complete heart block on latest EKG, therefore will proceed emergently with cardiac catheterization and possible PCI.        Code Status: full     I discussed the patients findings and my recommendations with: patient    Estimated length of stay: ?    Uli Holland MD   06/03/21   19:35 CDT

## 2021-06-04 NOTE — CASE MANAGEMENT/SOCIAL WORK
Discharge Planning Assessment   Leticia     Patient Name: Pipo Bentley  MRN: [id number]  Today's Date: 6/4/2021    Admit Date: 6/3/2021    Discharge Needs Assessment     Row Name 06/04/21 1109       Living Environment    Lives With  alone    Current Living Arrangements  home/apartment/condo    Primary Care Provided by  self    Provides Primary Care For  no one    Family Caregiver if Needed  child(lauren), adult    Quality of Family Relationships  supportive;helpful;involved    Able to Return to Prior Arrangements  yes       Resource/Environmental Concerns    Resource/Environmental Concerns  financial    Financial Concerns  insurance, none       Transition Planning    Patient/Family Anticipates Transition to  home    Patient/Family Anticipated Services at Transition      Transportation Anticipated  family or friend will provide       Discharge Needs Assessment    Readmission Within the Last 30 Days  no previous admission in last 30 days    Equipment Currently Used at Home  none    Concerns to be Addressed  financial/insurance    Anticipated Changes Related to Illness  none    Equipment Needed After Discharge  none    Current Discharge Risk  chronically ill;lives alone;financial support inadequate    Discharge Coordination/Progress  Patient resides at home alone, works, and functions independently.  Patient was screened by [employer] and he is eligible for Medicaid so he is now Medicaid Pending.  Patient may require assistance with discharge medications if Medicaid is not active at time of dc.  Patient does not have a PCP and will arrange follow up once Medicaid becomes active.        Discharge Plan    No documentation.       Continued Care and Services - Admitted Since 6/3/2021    Coordination has not been started for this encounter.         Demographic Summary    No documentation.       Functional Status    No documentation.       Psychosocial    No documentation.       Abuse/Neglect    No documentation.        Legal    No documentation.       Substance Abuse    No documentation.       Patient Forms    No documentation.           CORBY MenjivarW

## 2021-06-04 NOTE — ED PROVIDER NOTES
Subjective   This patient is a 50-year-old gentleman who is a smoker, has a history of hypertension and a positive family history has been having chest pain on and off for the last few days.  Finally went to [facility] today where they did an EKG that seem to show a STEMI.  I called to transfer him emergently over and we accepted.  In the ED he states that his pain is approximately 3 out of 10 and improved after he took 3-4 aspirin this morning.          Review of Systems   Cardiovascular: Positive for chest pain.   All other systems reviewed and are negative.      Past Medical History:   Diagnosis Date   • GERD (gastroesophageal reflux disease)    • Hypertension        No Known Allergies    No past surgical history on file.    No family history on file.             Objective   Physical Exam  Vitals and nursing note reviewed.   Constitutional:       Appearance: He is well-developed.   HENT:      Head: Normocephalic and atraumatic.      Right Ear: External ear normal.      Left Ear: External ear normal.      Nose: Nose normal.   Eyes:      Conjunctiva/sclera: Conjunctivae normal.   Cardiovascular:      Rate and Rhythm: Normal rate and regular rhythm.      Heart sounds: Normal heart sounds.   Pulmonary:      Effort: Pulmonary effort is normal.      Breath sounds: Normal breath sounds.   Abdominal:      General: Bowel sounds are normal.      Palpations: Abdomen is soft.   Musculoskeletal:         General: Normal range of motion.      Cervical back: Normal range of motion and neck supple.   Skin:     General: Skin is warm and dry.      Capillary Refill: Capillary refill takes less than 2 seconds.   Neurological:      Mental Status: He is alert and oriented to person, place, and time.   Psychiatric:         Behavior: Behavior normal.         Thought Content: Thought content normal.         Judgment: Judgment normal.         Procedures           ED Course                                           MDM  Number of  Diagnoses or Management Options  Patient Progress  Patient progress: stable      Final diagnoses:   ST elevation myocardial infarction (STEMI), unspecified artery (CMS/HCC)       ED Disposition  ED Disposition     ED Disposition Condition Comment    Send to Cath Lab            No follow-up provider specified.       Medication List      No changes were made to your prescriptions during this visit.       I discussed the case with Dr. Hylton who is going to take the patient straight to the Cath Lab.  The patient is currently stable in the ED     Naun Rubio MD  06/03/21 1352

## 2021-06-05 PROCEDURE — 99232 SBSQ HOSP IP/OBS MODERATE 35: CPT | Performed by: INTERNAL MEDICINE

## 2021-06-05 RX ORDER — METOPROLOL SUCCINATE 25 MG/1
25 TABLET, EXTENDED RELEASE ORAL
Status: DISCONTINUED | OUTPATIENT
Start: 2021-06-05 | End: 2021-06-06 | Stop reason: HOSPADM

## 2021-06-05 RX ADMIN — TICAGRELOR 90 MG: 90 TABLET ORAL at 08:41

## 2021-06-05 RX ADMIN — PANTOPRAZOLE SODIUM 40 MG: 40 TABLET, DELAYED RELEASE ORAL at 06:33

## 2021-06-05 RX ADMIN — NICOTINE 1 PATCH: 21 PATCH, EXTENDED RELEASE TRANSDERMAL at 20:51

## 2021-06-05 RX ADMIN — LISINOPRIL 2.5 MG: 2.5 TABLET ORAL at 08:41

## 2021-06-05 RX ADMIN — ATORVASTATIN CALCIUM 40 MG: 40 TABLET, FILM COATED ORAL at 20:52

## 2021-06-05 RX ADMIN — METOPROLOL SUCCINATE 25 MG: 25 TABLET, EXTENDED RELEASE ORAL at 11:55

## 2021-06-05 RX ADMIN — TICAGRELOR 90 MG: 90 TABLET ORAL at 20:52

## 2021-06-05 RX ADMIN — ASPIRIN 81 MG: 81 TABLET, CHEWABLE ORAL at 08:41

## 2021-06-05 NOTE — PROGRESS NOTES
[facility] -  Progress Note     LOS: 2 days   Patient Care Team:  Provider, No Known as PCP - General    Chief Complaint: INFERIOR STEMI    Subjective  - LATE PRESENTATION    Interval History: successful RCA PCI    Patient Complaints: no cp or soa. Has some mild right groin cath site pain with movement. Is up in room ok.        Review of Systems:  Review of Systems   Constitutional: Negative for appetite change and fatigue.   Respiratory: Negative for chest tightness and shortness of breath.    Cardiovascular: Negative for chest pain, palpitations and leg swelling.   Gastrointestinal: Negative for abdominal pain.   Genitourinary: Negative for difficulty urinating.           Vital Sign Min/Max for last 24 hours  Temp  Min: 97.8 °F (36.6 °C)  Max: 100.6 °F (38.1 °C)   BP  Min: 93/57  Max: 127/87   Pulse  Min: 72  Max: 96   Resp  Min: 14  Max: 21   SpO2  Min: 93 %  Max: 100 %   No data recorded   Weight  Min: 101 kg (222 lb 10.6 oz)  Max: 101 kg (222 lb 10.6 oz)         06/05/21  0600   Weight: 101 kg (222 lb 10.6 oz)       Physical Exam:   Constitutional:       Appearance: Not in distress.   Pulmonary:      Effort: Pulmonary effort is normal.      Breath sounds: No wheezing. No rhonchi.   Cardiovascular:      Normal rate. Regular rhythm. S1 with normal intensity. S2 with normal intensity.      Murmurs: There is no murmur.      No gallop. No click. No rub.   Pulses:     Intact distal pulses.   Edema:     Peripheral edema absent.   Abdominal:      General: Bowel sounds are normal. There is no distension.      Palpations: Abdomen is soft.      Tenderness: There is no abdominal tenderness.   Skin:     General: Skin is warm and dry.   Neurological:      General: No focal deficit present.             Results Review:   Lab Results (last 24 hours)     Procedure Component Value Units Date/Time    Blood Culture - Blood, Hand, Left [[id number]] Collected: 06/04/21 0442    Specimen: Blood from Hand, Left  Updated: 06/05/21 0530     Blood Culture No growth at 24 hours    Blood Culture - Blood, Hand, Right [[id number]] Collected: 06/04/21 0442    Specimen: Blood from Hand, Right Updated: 06/05/21 0530     Blood Culture No growth at 24 hours              Echo EF Estimated  51-55%      Cath Ejection Fraction Quantitative  40-45%        Medication Review: yes  Current Facility-Administered Medications   Medication Dose Route Frequency Provider Last Rate Last Admin   • acetaminophen (TYLENOL) tablet 650 mg  650 mg Oral Q4H PRN Uli Holland MD   650 mg at 06/04/21 2004   • aspirin chewable tablet 81 mg  81 mg Oral Daily Uli Holland MD   81 mg at 06/05/21 0841   • atorvastatin (LIPITOR) tablet 40 mg  40 mg Oral Nightly Uli Holland MD   40 mg at 06/04/21 2150   • lisinopril (PRINIVIL,ZESTRIL) tablet 2.5 mg  2.5 mg Oral Q24H Geraldo Kumari MD   2.5 mg at 06/05/21 0841   • nicotine (NICODERM CQ) 21 MG/24HR patch 1 patch  1 patch Transdermal Q24H Uli Holland MD   1 patch at 06/04/21 2151   • pantoprazole (PROTONIX) EC tablet 40 mg  40 mg Oral Q AM Geraldo Kumari MD   40 mg at 06/05/21 0633   • sodium chloride 0.9 % flush 10 mL  10 mL Intravenous PRN Uli Holland MD       • sodium chloride 0.9 % flush 10 mL  10 mL Intravenous PRN Uli Holland MD       • ticagrelor (BRILINTA) tablet 90 mg  90 mg Oral BID Uli Holland MD   90 mg at 06/05/21 0841         Assessment/Plan    STABLE AFTER DELAYED RCA PCI - delayed presentation - stable and ok to tel floor with increased activity and education. Start low dose BetaBlocker  POST MI FEVER - tx with Tylenol prn      Acute ST elevation myocardial infarction (STEMI) of inferior wall (CMS/HCC)    Tobacco abuse    Complete heart block (CMS/HCC)    ST elevation myocardial infarction (STEMI) (CMS/HCC)      DISCUSSED WITH PT - his nurse is not available at rounding time    Juan Beavers MD  06/05/21  11:19 CDT

## 2021-06-06 VITALS
OXYGEN SATURATION: 96 % | HEIGHT: 69 IN | WEIGHT: 222.66 LBS | TEMPERATURE: 98.5 F | HEART RATE: 86 BPM | DIASTOLIC BLOOD PRESSURE: 77 MMHG | BODY MASS INDEX: 32.98 KG/M2 | SYSTOLIC BLOOD PRESSURE: 122 MMHG | RESPIRATION RATE: 16 BRPM

## 2021-06-06 LAB
QT INTERVAL: 366 MS
QT INTERVAL: 404 MS
QT INTERVAL: 442 MS
QTC INTERVAL: 427 MS
QTC INTERVAL: 448 MS
QTC INTERVAL: 467 MS

## 2021-06-06 PROCEDURE — 99239 HOSP IP/OBS DSCHRG MGMT >30: CPT | Performed by: NURSE PRACTITIONER

## 2021-06-06 RX ORDER — ATORVASTATIN CALCIUM 40 MG/1
40 TABLET, FILM COATED ORAL NIGHTLY
Qty: 30 TABLET | Refills: 5 | Status: SHIPPED | OUTPATIENT
Start: 2021-06-06 | End: 2021-12-30 | Stop reason: SDUPTHER

## 2021-06-06 RX ORDER — LISINOPRIL 2.5 MG/1
2.5 TABLET ORAL
Qty: 30 TABLET | Refills: 5 | Status: SHIPPED | OUTPATIENT
Start: 2021-06-06 | End: 2021-06-14 | Stop reason: SINTOL

## 2021-06-06 RX ORDER — ASPIRIN 81 MG/1
81 TABLET, CHEWABLE ORAL DAILY
COMMUNITY
Start: 2021-06-06

## 2021-06-06 RX ORDER — METOPROLOL SUCCINATE 25 MG/1
25 TABLET, EXTENDED RELEASE ORAL
Qty: 30 TABLET | Refills: 5 | Status: SHIPPED | OUTPATIENT
Start: 2021-06-06 | End: 2021-12-30 | Stop reason: SDUPTHER

## 2021-06-06 RX ADMIN — PANTOPRAZOLE SODIUM 40 MG: 40 TABLET, DELAYED RELEASE ORAL at 05:42

## 2021-06-06 RX ADMIN — ASPIRIN 81 MG: 81 TABLET, CHEWABLE ORAL at 09:30

## 2021-06-06 RX ADMIN — LISINOPRIL 2.5 MG: 2.5 TABLET ORAL at 09:30

## 2021-06-06 RX ADMIN — TICAGRELOR 90 MG: 90 TABLET ORAL at 09:30

## 2021-06-06 RX ADMIN — METOPROLOL SUCCINATE 25 MG: 25 TABLET, EXTENDED RELEASE ORAL at 09:30

## 2021-06-06 NOTE — DISCHARGE SUMMARY
[facility] DISCHARGE    Date of Discharge:  6/6/2021    Discharge Diagnosis:   Acute STEMI of Inferior Wall, Delayed Response  Reperfusion Complete Heart Block - Resolved  Cigarette Smoker    Presenting Problem/History of Present Illness  ST elevation myocardial infarction (STEMI), 2' to RCA Stenosis(CMS/LTAC, located within [facility]) [I21.3]        Hospital Course  Mr. Bentley is a 50 y.o. male who presented to the [facility] ER with complaints of chest pain. Found to have inferior ST elevations, therefore, he was treated with Lovenox and transferred to [facility] for emergent cardiac catheterization.     Patient found to have severe single vessel disease with thrombotic occlusion of RCA. Successful PCI with multiple runs of thrombus aspiration and deployment of multiple DOMENICA. He tolerated the procedure without complication and was admitted to the CCU.     The following morning, patient noted to have fever 2' to inflammation from inferior STEMI, along with Mobitz 1 2' AVB. He remained in the CCU for close monitoring of rhythm. He was noted to be in SR on 06/05/2021, was started on low dose BB and transferred to telemetry floor.     He remained hemodynamically stable without any further arrhythmias. He was ambulating in his room, tolerating his diet and no further episodes of chest pain. He was felt stable for discharge home.         Procedures Performed  1. On 06/03/2021, Emergent Cardiac Catheterization with PCI, Thrombus Aspiration, and Deployment of DOMENICA to RCA by Dr. Froylan Hylton.       Consults:   Consults     Date and Time Order Name Status Description    6/3/2021  7:25 PM Consult Interventional Cardiology and Notify Cath Lab      6/3/2021  7:16 PM Consult Interventional Cardiology and Notify Cath Lab            Pertinent Test Results:     2D Echocardiogram:  · Left ventricular ejection fraction appears to be 51 - 55%. Left ventricular systolic function is low normal.  · Left ventricular wall thickness is  consistent with mild to moderate concentric hypertrophy.  · The following left ventricular wall segments are hypokinetic: basal inferolateral, mid inferolateral, apical inferior, mid inferior and basal inferior.  · Left ventricular diastolic function was normal.  · The right ventricular cavity is mild to moderately dilated.  · Mildly reduced right ventricular systolic function noted.  · The right atrial cavity is mildly dilated.  · There is no significant (greater than mild) valvular dysfunction.    Condition on Discharge: Stable    Physical Exam at Discharge    Vital Signs  Temp:  [98.2 °F (36.8 °C)-98.8 °F (37.1 °C)] 98.5 °F (36.9 °C)  Heart Rate:  [82-94] 86  Resp:  [14-16] 16  BP: ()/(67-95) 122/77    Physical Exam:     General Appearance:    Alert, cooperative, in no acute distress   HEENT:    Normocephalic. Atraumatic. HECTOR   Lungs:     Clear to auscultation, respirations regular, even and unlabored    Heart:    Regular rhythm and normal rate, normal S1 and S2, no murmur, no gallop, no rub, no click   Abdomen:     Soft, non-tender with active bowel sounds x 4   Extremities:   Moves all extremities, no edema, no cyanosis, no redness   Pulses:   Pulses palpable and equal bilaterally   Skin:   No bleeding, bruising or rash   Neurologic:   Grossly intact          Discharge Disposition  Home    Discharge Medications     Discharge Medications      New Medications      Instructions Start Date   aspirin 81 MG chewable tablet   81 mg, Oral, Daily      atorvastatin 40 MG tablet  Commonly known as: LIPITOR   40 mg, Oral, Nightly      lisinopril 2.5 MG tablet  Commonly known as: PRINIVIL,ZESTRIL   2.5 mg, Oral, Every 24 Hours Scheduled      metoprolol succinate XL 25 MG 24 hr tablet  Commonly known as: TOPROL-XL   25 mg, Oral, Every 24 Hours Scheduled      ticagrelor 90 MG tablet tablet  Commonly known as: BRILINTA   90 mg, Oral, 2 Times Daily             Discharge Diet: Cardiac    Activity at Discharge: No  strenuous activity x 4 weeks.    Follow-up Appointments  1. Appointment with Primary Care Physician in 1 week.   2. Cardiology Follow-up with DAREN Demarco in 4 weeks.         Additional Instructions for the Follow-ups that You Need to Schedule     Ambulatory Referral to Cardiac Rehab   As directed               DAREN Pepper  06/06/21  09:02 CDT

## 2021-06-07 ENCOUNTER — READMISSION MANAGEMENT (OUTPATIENT)
Dept: CALL CENTER | Facility: HOSPITAL | Age: 51
End: 2021-06-07

## 2021-06-07 NOTE — OUTREACH NOTE
Prep Survey      Responses   [facility]  [place]   Is LACE score < 7 ?  No   Emergency Room discharge w/ pulse ox?  No   Eligibility  Readm Mgmt   Discharge diagnosis  ST elevation myocardial infarction    Does the patient have one of the following disease processes/diagnoses(primary or secondary)?  Acute MI (STEMI,NSTEMI)   Does the patient have Home health ordered?  No   Is there a DME ordered?  No   Prep survey completed?  Yes          Mary Jane Vidal RN

## 2021-06-08 ENCOUNTER — TELEPHONE (OUTPATIENT)
Dept: CARDIOLOGY | Facility: CLINIC | Age: 51
End: 2021-06-08

## 2021-06-08 ENCOUNTER — READMISSION MANAGEMENT (OUTPATIENT)
Dept: CALL CENTER | Facility: HOSPITAL | Age: 51
End: 2021-06-08

## 2021-06-08 NOTE — TELEPHONE ENCOUNTER
Mr Bentley called today to let us know he has no insurance and has signed up it but still waiting to be notified. He does not have  Brilinta so I have put samples at the  and patient has been notified to pick them up.

## 2021-06-08 NOTE — OUTREACH NOTE
AMI Week 1 Survey      Responses   [facility] patient discharged from?  [place]   Does the patient have one of the following disease processes/diagnoses(primary or secondary)?  Acute MI (STEMI,NSTEMI)   Week 1 attempt successful?  Yes   Call start time  1745   Call end time  1757   Discharge diagnosis  ST elevation myocardial infarction    Is patient permission given to speak with other caregiver?  No   List who call center can speak with  Patient only   Meds reviewed with patient/caregiver?  Yes   Is the patient having any side effects they believe may be caused by any medication additions or changes?  No   Does the patient have all prescriptions related to this admission filled (includes statins,anticoagulants,HTN meds,anti-arrhythmia meds)  Yes   Is the patient taking all medications as directed (includes completed medication regime)?  Yes   Does the patient have a primary care provider?   No   Does the patient have an appointment with their PCP,cardiologist,or clinic within 7 days of discharge?  Greater than 7 days   What is preventing the patient from scheduling follow up appointments within 7 days of discharge?  Earlier appointment not available   Nursing Interventions  Verified appointment date/time/provider   Comments  Has cardiology appt scheduled.    New PCP Dr Sandoval 06/14/2021   Has home health visited the patient within 72 hours of discharge?  N/A   Psychosocial issues?  No   Comments  Has a fever up to 102 He had fever in the hospital and the doctor told him it was because of the heart attack. Denies obvious source of infection, advised to notify MD of persistent fever.   Did the patient receive a copy of their discharge instructions?  Yes   What is the patient's perception of their health status since discharge?  Improving   Nursing interventions  Nurse provided patient education   Is the patient/caregiver able to teach back signs and symptoms of when to call for help immediately:  Sudden  chest discomfort, Sudden discomfort in arms, back, neck or jaw, Shortness of breath at any time, Sudden sweating or clammy skin, Irregular or rapid heart rate, Nausea or vomiting, Dizziness or lightheadedness   Nursing interventions  Nurse provided patient education   Is the patient/caregiver able to teach back lifestyle changes to help prevent MIs  Reducing stress, Maintaining a healthy weight, Quit smoking, Heart healthy diet   Is the patient/caregiver able to teach back ways to prevent a second heart attack:  Take medications, Follow up with MD, Manage risk factors   If the patient is a current smoker, are they able to teach back resources for cessation?  Smoking cessation medications   Is the patient/caregiver able to teach back the hierarchy of who to call/visit for symptoms/problems? PCP, Specialist, Home health nurse, Urgent Care, ED, 911  Yes   Additional teach back comments  Bruising to cath site.   Week 1 call completed?  Yes          Laura Simpson RN

## 2021-06-09 LAB
BACTERIA SPEC AEROBE CULT: NORMAL
BACTERIA SPEC AEROBE CULT: NORMAL

## 2021-06-14 ENCOUNTER — OFFICE VISIT (OUTPATIENT)
Dept: INTERNAL MEDICINE | Facility: CLINIC | Age: 51
End: 2021-06-14

## 2021-06-14 ENCOUNTER — LAB (OUTPATIENT)
Dept: LAB | Facility: HOSPITAL | Age: 51
End: 2021-06-14

## 2021-06-14 VITALS
RESPIRATION RATE: 15 BRPM | TEMPERATURE: 97.2 F | SYSTOLIC BLOOD PRESSURE: 120 MMHG | HEIGHT: 69 IN | OXYGEN SATURATION: 98 % | DIASTOLIC BLOOD PRESSURE: 60 MMHG | BODY MASS INDEX: 31.76 KG/M2 | WEIGHT: 214.4 LBS | HEART RATE: 79 BPM

## 2021-06-14 DIAGNOSIS — R74.8 ELEVATED LIVER ENZYMES: ICD-10-CM

## 2021-06-14 DIAGNOSIS — Z11.59 ENCOUNTER FOR HEPATITIS C SCREENING TEST FOR LOW RISK PATIENT: ICD-10-CM

## 2021-06-14 DIAGNOSIS — E66.9 OBESITY (BMI 30-39.9): ICD-10-CM

## 2021-06-14 DIAGNOSIS — I25.10 CORONARY ARTERY DISEASE INVOLVING NATIVE CORONARY ARTERY OF NATIVE HEART, ANGINA PRESENCE UNSPECIFIED: Primary | ICD-10-CM

## 2021-06-14 DIAGNOSIS — Z72.0 TOBACCO ABUSE: ICD-10-CM

## 2021-06-14 DIAGNOSIS — I10 ESSENTIAL HYPERTENSION: ICD-10-CM

## 2021-06-14 LAB
ALBUMIN SERPL-MCNC: 3.8 G/DL (ref 3.5–5.2)
ALBUMIN/GLOB SERPL: 1.1 G/DL
ALP SERPL-CCNC: 92 U/L (ref 39–117)
ALT SERPL W P-5'-P-CCNC: 53 U/L (ref 1–41)
ANION GAP SERPL CALCULATED.3IONS-SCNC: 9 MMOL/L (ref 5–15)
AST SERPL-CCNC: 21 U/L (ref 1–40)
BILIRUB SERPL-MCNC: 0.3 MG/DL (ref 0–1.2)
BUN SERPL-MCNC: 6 MG/DL (ref 6–20)
BUN/CREAT SERPL: 5.6 (ref 7–25)
CALCIUM SPEC-SCNC: 9.5 MG/DL (ref 8.6–10.5)
CHLORIDE SERPL-SCNC: 104 MMOL/L (ref 98–107)
CO2 SERPL-SCNC: 28 MMOL/L (ref 22–29)
CREAT SERPL-MCNC: 1.07 MG/DL (ref 0.76–1.27)
GFR SERPL CREATININE-BSD FRML MDRD: 73 ML/MIN/1.73
GLOBULIN UR ELPH-MCNC: 3.6 GM/DL
GLUCOSE SERPL-MCNC: 99 MG/DL (ref 65–99)
POTASSIUM SERPL-SCNC: 4.4 MMOL/L (ref 3.5–5.2)
PROT SERPL-MCNC: 7.4 G/DL (ref 6–8.5)
SODIUM SERPL-SCNC: 141 MMOL/L (ref 136–145)

## 2021-06-14 PROCEDURE — 99214 OFFICE O/P EST MOD 30 MIN: CPT | Performed by: INTERNAL MEDICINE

## 2021-06-14 PROCEDURE — 80053 COMPREHEN METABOLIC PANEL: CPT

## 2021-06-14 PROCEDURE — 36415 COLL VENOUS BLD VENIPUNCTURE: CPT

## 2021-06-14 PROCEDURE — 99406 BEHAV CHNG SMOKING 3-10 MIN: CPT | Performed by: INTERNAL MEDICINE

## 2021-06-14 PROCEDURE — 86803 HEPATITIS C AB TEST: CPT

## 2021-06-14 RX ORDER — LOSARTAN POTASSIUM 25 MG/1
12.5 TABLET ORAL DAILY
Qty: 30 TABLET | Refills: 2 | Status: SHIPPED | OUTPATIENT
Start: 2021-06-14

## 2021-06-14 NOTE — PROGRESS NOTES
Chief Complaint   Patient presents with   • Establish Care   • Sleeping Issues     About every 2 hours         History:  Pipo Bentley is a 50 y.o. male who presents today for evaluation of the above problems.      He presents today to establish care.  He was recently admitted to our hospital [facility] through June 6, 2021 for ST elevation MI involving the right coronary artery.  He was found to have severe single-vessel disease with thrombotic occlusion of the right coronary artery.  He had a successful PCI with multiple rounds of thrombus aspirated and had multiple drug-eluting stents.  He was admitted to the CCU he was noted to have a fever secondary to inflammation and.  Mobitz 1 second-degree AV block.  He was started on the appropriate medications with aspirin, Brilinta, Lipitor, Toprol-XL and lisinopril 2.5 mg daily.    His renal enzymes were elevated with an AST of 219 and ALT of 77.  He denies any strong alcohol history, and drinks just occasionally    Apparently he was only taking Brilinta once a day rather than twice a day.  He also reports lisinopril is giving him a cough.    His cough started while in the hospital.  It is aggravating.  Related to the cough with mild shortness of breath.    He denies any more chest pain.    He wakes up every 1-1/2 to 2 hours he thinks to urinate.  He is trying to cut back on his drinking before bedtime.  He does not like drinking water but does drink about 5 or 6 soft drinks per day and 2 cups of coffee in the morning.    His mother had her first heart attack at the age of 36    HPI     Social History     Socioeconomic History   • Marital status:      Spouse name: Not on file   • Number of children: Not on file   • Years of education: Not on file   • Highest education level: Not on file   Tobacco Use   • Smoking status: Current Every Day Smoker     Packs/day: 1.00     Years: 35.00     Pack years: 35.00     Types: Cigarettes   • Smokeless tobacco: Never Used   Substance  and Sexual Activity   • Alcohol use: Never   • Drug use: Never   • Sexual activity: Yes         ROS:  Review of Systems   Constitutional: Negative for diaphoresis, fatigue and fever.   Respiratory: Positive for cough. Negative for shortness of breath.    Cardiovascular: Negative.  Negative for chest pain, palpitations and leg swelling.   Gastrointestinal: Negative.    Genitourinary:        Recent nocturia     Hematological: Bruises/bleeds easily.   Psychiatric/Behavioral: Positive for sleep disturbance.         Current Outpatient Medications:   •  aspirin 81 MG chewable tablet, Chew 1 tablet Daily., Disp: , Rfl:   •  atorvastatin (LIPITOR) 40 MG tablet, Take 1 tablet by mouth Every Night., Disp: 30 tablet, Rfl: 5  •  metoprolol succinate XL (TOPROL-XL) 25 MG 24 hr tablet, Take 1 tablet by mouth Daily., Disp: 30 tablet, Rfl: 5  •  ticagrelor (BRILINTA) 90 MG tablet tablet, Take 1 tablet by mouth 2 (Two) Times a Day. (Patient taking differently: Take 90 mg by mouth Daily.), Disp: 60 tablet, Rfl: 11  •  losartan (COZAAR) 25 MG tablet, Take 0.5 tablets by mouth Daily., Disp: 30 tablet, Rfl: 2    Lab Results   Component Value Date    GLUCOSE 111 (H) 06/04/2021    BUN 18 06/04/2021    CREATININE 1.04 06/04/2021    EGFRIFNONA 76 06/04/2021    BCR 17.3 06/04/2021    K 3.8 06/04/2021    CO2 22.0 06/04/2021    CALCIUM 8.4 (L) 06/04/2021    ALBUMIN 3.80 06/03/2021     (H) 06/03/2021    ALT 77 (H) 06/03/2021       WBC   Date Value Ref Range Status   06/04/2021 13.71 (H) 3.40 - 10.80 10*3/mm3 Final     RBC   Date Value Ref Range Status   06/04/2021 3.93 (L) 4.14 - 5.80 10*6/mm3 Final     Hemoglobin   Date Value Ref Range Status   06/04/2021 12.1 (L) 13.0 - 17.7 g/dL Final     Hematocrit   Date Value Ref Range Status   06/04/2021 35.0 (L) 37.5 - 51.0 % Final     MCV   Date Value Ref Range Status   06/04/2021 89.1 79.0 - 97.0 fL Final     MCH   Date Value Ref Range Status   06/04/2021 30.8 26.6 - 33.0 pg Final     MCHC  "  Date Value Ref Range Status   06/04/2021 34.6 31.5 - 35.7 g/dL Final     RDW   Date Value Ref Range Status   06/04/2021 11.9 (L) 12.3 - 15.4 % Final     RDW-SD   Date Value Ref Range Status   06/04/2021 39.5 37.0 - 54.0 fl Final     MPV   Date Value Ref Range Status   06/04/2021 11.3 6.0 - 12.0 fL Final     Platelets   Date Value Ref Range Status   06/04/2021 194 140 - 450 10*3/mm3 Final     Neutrophil %   Date Value Ref Range Status   06/03/2021 65.4 42.7 - 76.0 % Final     Lymphocyte %   Date Value Ref Range Status   06/03/2021 23.3 19.6 - 45.3 % Final     Monocyte %   Date Value Ref Range Status   06/03/2021 9.7 5.0 - 12.0 % Final     Eosinophil %   Date Value Ref Range Status   06/03/2021 0.4 0.3 - 6.2 % Final     Basophil %   Date Value Ref Range Status   06/03/2021 0.8 0.0 - 1.5 % Final     Immature Grans %   Date Value Ref Range Status   06/03/2021 0.4 0.0 - 0.5 % Final     Neutrophils, Absolute   Date Value Ref Range Status   06/03/2021 10.64 (H) 1.70 - 7.00 10*3/mm3 Final     Lymphocytes, Absolute   Date Value Ref Range Status   06/03/2021 3.80 (H) 0.70 - 3.10 10*3/mm3 Final     Monocytes, Absolute   Date Value Ref Range Status   06/03/2021 1.58 (H) 0.10 - 0.90 10*3/mm3 Final     Eosinophils, Absolute   Date Value Ref Range Status   06/03/2021 0.07 0.00 - 0.40 10*3/mm3 Final     Basophils, Absolute   Date Value Ref Range Status   06/03/2021 0.13 0.00 - 0.20 10*3/mm3 Final     Immature Grans, Absolute   Date Value Ref Range Status   06/03/2021 0.07 (H) 0.00 - 0.05 10*3/mm3 Final     nRBC   Date Value Ref Range Status   06/03/2021 0.0 0.0 - 0.2 /100 WBC Final         OBJECTIVE:  Visit Vitals  /60 (BP Location: Left arm, Patient Position: Sitting, Cuff Size: Adult)   Pulse 79   Temp 97.2 °F (36.2 °C) (Oral)   Resp 15   Ht 175.3 cm (69.02\")   Wt 97.3 kg (214 lb 6.4 oz)   SpO2 98%   BMI 31.65 kg/m²      Physical Exam  Vitals reviewed.   Constitutional:       General: He is not in acute distress.     " Appearance: He is well-developed. He is obese.      Comments: Pleasant   HENT:      Head: Normocephalic and atraumatic.   Eyes:      Pupils: Pupils are equal, round, and reactive to light.   Neck:      Thyroid: No thyromegaly.      Trachea: Phonation normal.   Cardiovascular:      Rate and Rhythm: Normal rate and regular rhythm.      Heart sounds: No murmur heard.     Pulmonary:      Effort: Pulmonary effort is normal. No respiratory distress.      Breath sounds: Normal breath sounds. No stridor. No rhonchi or rales.   Abdominal:      General: Abdomen is flat.      Palpations: Abdomen is soft.      Tenderness: There is no abdominal tenderness. There is no guarding.   Skin:     Coloration: Skin is not pale.      Findings: No erythema.      Comments: Very small hematoma in the right groin.  Mildly tender only   Neurological:      Mental Status: He is alert.   Psychiatric:         Behavior: Behavior normal.         Thought Content: Thought content normal.         Judgment: Judgment normal.         Assessment/Plan    Diagnoses and all orders for this visit:    1. Coronary artery disease involving native coronary artery of native heart, angina presence unspecified (Primary)  -     losartan (COZAAR) 25 MG tablet; Take 0.5 tablets by mouth Daily.  Dispense: 30 tablet; Refill: 2    2. Tobacco abuse    3. Obesity (BMI 30-39.9)    4. Essential hypertension    5. Elevated liver enzymes  -     Comprehensive metabolic panel; Future    6. Encounter for hepatitis C screening test for low risk patient  -     Hepatitis C Antibody; Future      He recently had ST elevation MI with thrombosis in the right coronary artery.  He is status post drug-eluting stent.  He has been taking Brilinta only once a day.  Instructed him to start taking this twice a day.  Given a possible cough related to lisinopril going to change this to losartan 12.5 mg daily.  He will keep an eye on his blood pressure at home.    Goal LDL less than 70.    His liver  enzymes were elevated during hospitalization, which may be related to his MI.  Going to check hepatitis C antibody and repeat a CMP today    Discussed COVID-19 vaccine with him today and he is interested in getting it.      Pipo Bentley  reports that he has been smoking cigarettes. He has a 35.00 pack-year smoking history. He has never used smokeless tobacco.. I have educated him on the risk of diseases from using tobacco products such as cancer, COPD and heart disease.     I advised him to quit and he is willing to quit. We have discussed the following method/s for tobacco cessation:  OTC Cessation Products.  Together we have set a quit date for Thanksgiving 2021.  He will follow up with me in 6 months or sooner to check on his progress.    I spent 3.5 minutes counseling the patient.    Return in about 6 months (around 12/14/2021) for Annual physical.    Patient was given instructions and counseling regarding his/her condition or for health maintenance advice. Please see specific information pulled into the AVS if appropriate.     PATRICIA Sandoval MD   13:14 CDT  6/14/2021

## 2021-06-15 LAB — HCV AB SER DONR QL: NORMAL

## 2021-06-18 ENCOUNTER — READMISSION MANAGEMENT (OUTPATIENT)
Dept: CALL CENTER | Facility: HOSPITAL | Age: 51
End: 2021-06-18

## 2021-06-18 ENCOUNTER — TELEPHONE (OUTPATIENT)
Dept: INTERNAL MEDICINE | Facility: CLINIC | Age: 51
End: 2021-06-18

## 2021-06-18 NOTE — TELEPHONE ENCOUNTER
PATIENT HAS BEEN CALLED, HE STATED THAT HE HAS TEMPORARY MEDICAID.  HE STATE THAT HIS INSURANCE IS NOT GOING TO PAY FOR BRILINTA.   HE STATED THAT HE IS STILL HAVING A COUGH EVEN BEING ON LOSARTAN.   PATIENT IS ALSO ASKING ABOUT WHEN HE CAN RETURN TO WORK.

## 2021-06-18 NOTE — OUTREACH NOTE
AMI Week 2 Survey      Responses   [facility] patient discharged from?  [place]   Does the patient have one of the following disease processes/diagnoses(primary or secondary)?  Acute MI (STEMI,NSTEMI)   Week 2 attempt successful?  Yes   Call start time  1129   Call end time  1138   Discharge diagnosis  ST elevation myocardial infarction    Meds reviewed with patient/caregiver?  Yes   Is the patient having any side effects they believe may be caused by any medication additions or changes?  Yes   Side effects comments   SOA w/ lisinopril and MD stopped.   Does the patient have all prescriptions related to this admission filled (includes statins,anticoagulants,HTN meds,anti-arrhythmia meds)  Yes   Is the patient taking all medications as directed (includes completed medication regime)?  Yes   Does the patient have a primary care provider?   Yes   Has the patient kept scheduled appointments due by today?  Yes   Has home health visited the patient within 72 hours of discharge?  N/A   Psychosocial issues?  No   Comments  Afebrile   Did the patient receive a copy of their discharge instructions?  Yes   Nursing interventions  Reviewed instructions with patient   What is the patient's perception of their health status since discharge?  Improving   Nursing interventions  Nurse provided patient education   Is the patient/caregiver able to teach back signs and symptoms of when to call for help immediately:  Sudden chest discomfort, Sudden discomfort in arms, back, neck or jaw, Shortness of breath at any time   Nursing interventions  Nurse provided patient education   Is the pateint /caregiver able to teach back the importance of cardiac rehab?  Yes   Nursing interventions  Provided education on importance of cardiac rehab   Is the patient/caregiver able to teach back lifestyle changes to help prevent MIs  Maintaining a healthy weight, Heart healthy diet, Regular exercise as approved by provider, Quit smoking   Is the  patient/caregiver able to teach back ways to prevent a second heart attack:  Take medications, Follow up with MD, Participate in Cardiac Rehab   If the patient is a current smoker, are they able to teach back resources for cessation?  [id number] [Reduce by 1 a day x 7 days.]   Is the patient/caregiver able to teach back the hierarchy of who to call/visit for symptoms/problems? PCP, Specialist, Home health nurse, Urgent Care, ED, 911  Yes   Additional teach back comments  He wants to return to work but unsure of when. Encouraged cards visit and request. Says he feels good, saw PCP.   Week 2 call completed?  Yes   Wrap up additional comments  Improving.          Nurys Collier RN

## 2021-06-18 NOTE — TELEPHONE ENCOUNTER
MESSAGE LEFT FOR GELA AT THE HEART GROUP FOR RETURN CALL.   NEED TO KNOW WHO TO FORWARD MESSAGE TO .

## 2021-06-18 NOTE — TELEPHONE ENCOUNTER
Dr. Kumari I called the pt back. He has several samples of Brilinta. His ins will  on 21. I advised him to call when he is almost out of meds. He has a d/c fu with FIDEL Vasques on . He wants to know when can he return to work. I advised him to keep his appt and will see if we can clear him for work then. Please advise

## 2021-06-18 NOTE — TELEPHONE ENCOUNTER
Caller: Pipo Bentley    Relationship: Self    Best call back number:[phone]    What medications are you currently taking:   Current Outpatient Medications on File Prior to Visit   Medication Sig Dispense Refill   • aspirin 81 MG chewable tablet Chew 1 tablet Daily.     • atorvastatin (LIPITOR) 40 MG tablet Take 1 tablet by mouth Every Night. 30 tablet 5   • losartan (COZAAR) 25 MG tablet Take 0.5 tablets by mouth Daily. 30 tablet 2   • metoprolol succinate XL (TOPROL-XL) 25 MG 24 hr tablet Take 1 tablet by mouth Daily. 30 tablet 5   • ticagrelor (BRILINTA) 90 MG tablet tablet Take 1 tablet by mouth 2 (Two) Times a Day. (Patient taking differently: Take 90 mg by mouth Daily.) 60 tablet 11     No current facility-administered medications on file prior to visit.        Which medication are you concerned about:    ticagrelor (BRILINTA) 90 MG tablet tablet         Who prescribed you this medication: HOSPITAL DOCTOR    What are your concerns: INSURANCE IS NOT PAYING FOR MEDICATION       PATIENT ALSO WANTS TO KNOW  HE IS ABLE TO RETURN TO WORK AND HE IS STILL HAVING A COUGH

## 2021-06-23 NOTE — TELEPHONE ENCOUNTER
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  He can stop the second antiplatelet agent and just continue aspirin after 1 year from his stent placement.

## 2021-09-22 ENCOUNTER — TELEPHONE (OUTPATIENT)
Dept: CARDIOLOGY | Facility: CLINIC | Age: 51
End: 2021-09-22

## 2021-09-22 ENCOUNTER — TELEPHONE (OUTPATIENT)
Dept: INTERNAL MEDICINE | Facility: CLINIC | Age: 51
End: 2021-09-22

## 2021-09-22 NOTE — TELEPHONE ENCOUNTER
Caller: Pipo Bentley    Relationship: Self    Best call back number: [phone]    What is the best time to reach you: ANYTIME     Who are you requesting to speak with (clinical staff, provider,  specific staff member): CLINICAL         What was the call regarding: PATIENT CALLED IN REQUESTING A CALL BACK TO DISCUSS GETTING THE  BRILINTA CHANGED TO SOMETHING MORE AFFORDABLE OR IF WE HAVE ANY SAMPLES IN OFFICE   PATIENT STATES HE DOES NOT HAVE ANY INSURANCE RIGHT NOW AND THE MEDICATION IS $450     Do you require a callback: YES

## 2021-09-22 NOTE — TELEPHONE ENCOUNTER
This pt has 2 days left of Brilinta. He said he cannot afford the $400 copay on his prescription. Please advise

## 2021-09-22 NOTE — TELEPHONE ENCOUNTER
I called patient and told him that he would need to call the GI office because Zee Shea is the prescriber for that medication.

## 2021-09-23 RX ORDER — CLOPIDOGREL BISULFATE 75 MG/1
75 TABLET ORAL DAILY
Qty: 30 TABLET | Refills: 11 | Status: SHIPPED | OUTPATIENT
Start: 2021-09-23

## 2021-09-23 NOTE — TELEPHONE ENCOUNTER
I sent in a prescription for Plavix.  Patient needs to take 4 tablets (300 mg) for the first dose of Plavix and then 1 tablet (75 mg) daily thereafter.  Please make sure he keeps his follow-up appointment for tomorrow as it appears he missed his follow-up appointment in July.

## 2021-09-27 PROBLEM — E78.5 HYPERLIPIDEMIA LDL GOAL <70: Status: ACTIVE | Noted: 2021-09-27

## 2021-11-09 NOTE — PLAN OF CARE
Goal Outcome Evaluation:   Pt is being dc/d home in stable condition.  Vital signs stable, he denies any pain.  Right groin heart cath site, ANTHONY.  Slightly bruised et tender.  No drainage noted, site soft et clean.  Pt verbalized understanding of d/c papers et importance of picking up script.          
Goal Outcome Evaluation:        Outcome Summary: No complaints of pain. Rt groin bruised, soft and tender with slight swelling, unchanged this shift. Alert and oriented. pleasant and talkative.  
Goal Outcome Evaluation:     Progress: improving  Outcome Summary: No c/o pain. Nitro gtt off. UOP adequate. R groin dsg old drainage. VSS. Pulses palpable. Continue to monitor  
Goal Outcome Evaluation:  Plan of Care Reviewed With: patient  Progress: improving   Pt had c/o chest pain this AM, EKG was improved over admission to unit.  Pt was repositioned with relief of symptoms.  He has been up out of bed, tolerated his diet, and is starting to feel closer to his baseline.  
Plan: I recommend broadspectrum sunscreen containing physical blockers including zinc oxide and / or titanium dioxide with at least SPF 30
Detail Level: Zone

## 2021-12-30 RX ORDER — ATORVASTATIN CALCIUM 40 MG/1
40 TABLET, FILM COATED ORAL NIGHTLY
Qty: 30 TABLET | Refills: 0 | Status: SHIPPED | OUTPATIENT
Start: 2021-12-30 | End: 2021-12-31

## 2021-12-30 RX ORDER — METOPROLOL SUCCINATE 25 MG/1
25 TABLET, EXTENDED RELEASE ORAL
Qty: 30 TABLET | Refills: 0 | Status: SHIPPED | OUTPATIENT
Start: 2021-12-30 | End: 2021-12-31

## 2021-12-31 ENCOUNTER — NURSE TRIAGE (OUTPATIENT)
Dept: CALL CENTER | Facility: HOSPITAL | Age: 51
End: 2021-12-31

## 2021-12-31 RX ORDER — ATORVASTATIN CALCIUM 40 MG/1
40 TABLET, FILM COATED ORAL NIGHTLY
Qty: 30 TABLET | Refills: 0 | Status: SHIPPED | OUTPATIENT
Start: 2021-12-31 | End: 2022-01-30

## 2021-12-31 RX ORDER — METOPROLOL SUCCINATE 25 MG/1
25 TABLET, EXTENDED RELEASE ORAL
Qty: 30 TABLET | Refills: 0 | Status: SHIPPED | OUTPATIENT
Start: 2021-12-31 | End: 2022-01-30

## 2021-12-31 NOTE — TELEPHONE ENCOUNTER
"Ran out of Lipitor and Toprol Xl- ordered per Dr. Santana see note - 30 days supply only no refills. Notified the caller.     Reason for Disposition  • [1] Follow-up call from patient regarding patient's clinical status AND [2] information urgent    Additional Information  • Negative: Lab calling with strep throat test results and triager can call in prescription  • Negative: Lab calling with urinalysis test results and triager can call in prescription  • Negative: Medication questions  • Negative: ED call to PCP  • Negative: Physician call to PCP  • Negative: Call about patient who is currently hospitalized  • Negative: Lab or radiology calling with CRITICAL test results  • Negative: [1] Prescription not at pharmacy AND [2] was prescribed today by PCP    Answer Assessment - Initial Assessment Questions  1. REASON FOR CALL or QUESTION: \"What is your reason for calling today?\" or \"How can I best  help you?\" or \"What question do you have that I can help answer?\"      See note   2. CALLER: Document the source of call. (e.g., laboratory, patient).      See note    Protocols used: PCP CALL - NO TRIAGE-ADULT-      "

## 2022-01-03 RX ORDER — METOPROLOL SUCCINATE 25 MG/1
25 TABLET, EXTENDED RELEASE ORAL
Qty: 30 TABLET | Refills: 0 | OUTPATIENT
Start: 2022-01-03 | End: 2022-02-02

## 2022-01-03 RX ORDER — ATORVASTATIN CALCIUM 40 MG/1
40 TABLET, FILM COATED ORAL NIGHTLY
Qty: 30 TABLET | Refills: 0 | OUTPATIENT
Start: 2022-01-03 | End: 2022-02-02

## 2022-01-03 NOTE — TELEPHONE ENCOUNTER
I attempted to contact patient regarding refill request. I was going to schedule an appointment. No answer. Left VM to contact office and schedule an appointment.

## 2022-02-28 DIAGNOSIS — I25.10 CORONARY ARTERY DISEASE INVOLVING NATIVE CORONARY ARTERY OF NATIVE HEART: ICD-10-CM

## 2022-03-01 RX ORDER — LOSARTAN POTASSIUM 25 MG/1
TABLET ORAL
Qty: 30 TABLET | Refills: 0 | OUTPATIENT
Start: 2022-03-01

## 2022-03-09 DIAGNOSIS — I25.10 CORONARY ARTERY DISEASE INVOLVING NATIVE CORONARY ARTERY OF NATIVE HEART: ICD-10-CM

## 2022-03-09 RX ORDER — LOSARTAN POTASSIUM 25 MG/1
12.5 TABLET ORAL DAILY
Qty: 30 TABLET | Refills: 2 | OUTPATIENT
Start: 2022-03-09

## 2022-03-09 NOTE — TELEPHONE ENCOUNTER
Caller: Nancy Bentley    Relationship: Emergency Contact    Requested Prescriptions:   Requested Prescriptions     Pending Prescriptions Disp Refills   • losartan (COZAAR) 25 MG tablet 30 tablet 2     Sig: Take 0.5 tablets by mouth Daily.        Pharmacy where request should be sent: [facility] [address] [place], [place] - [address] - [phone]  - [phone]     Gelacio Pritchett, BHAVNA   03/09/22 09:30 CST

## 2022-03-09 NOTE — TELEPHONE ENCOUNTER
Phone number on chart is not working. I have mailed a letter to the patient to make an appointment

## (undated) DEVICE — 6F .070 JR 4 100CM: Brand: CORDIS

## (undated) DEVICE — ELECTRD PAD DEFIB A/

## (undated) DEVICE — Device

## (undated) DEVICE — CANN CO2/O2 NASL A/

## (undated) DEVICE — THE PRONTO LP CATHETER IS INDICATED FOR THE REMOVAL OF FRESH, SOFT EMBOLI AND THROMBI FROM VESSELS IN THE CORONARY AND PERIPHERAL SYSTEM.: Brand: PRONTO® LP EXTRACTION CATHETER

## (undated) DEVICE — DEV TORQ GW HOT/PINK

## (undated) DEVICE — TREK CORONARY DILATATION CATHETER 2.75 MM X 20 MM / RAPID-EXCHANGE: Brand: TREK

## (undated) DEVICE — CATH F6INF PIG 145 110CM 6SH: Brand: INFINITI

## (undated) DEVICE — PK CATH CARD 30 CA/4

## (undated) DEVICE — HI-TORQUE POWERTURN FLEX GUIDE WIRE W/HYDROPHILIC COATING .014" STRAIGHT TIP 190 CM: Brand: HI-TORQUE POWERTURN

## (undated) DEVICE — INFLATION DEVICE: Brand: ENCORE™ 26

## (undated) DEVICE — THE PRONTO CATHETER IS INDICATED FOR THE REMOVAL OF FRESH, SOFT EMBOLI AND THROMBI FROM VESSELS IN THE CORONARY AND PERIPHERAL VASCULATURE.: Brand: PRONTO® V4 EXTRACTION CATHETER

## (undated) DEVICE — GW STARTER FXD CORE J .035 3X150CM 3MM

## (undated) DEVICE — CATH F6INF TL JL 4 100CM: Brand: INFINITI

## (undated) DEVICE — SOL IRR NACL 0.9PCT BT 1000ML

## (undated) DEVICE — ANGIO-SEAL VIP VASCULAR CLOSURE DEVICE: Brand: ANGIO-SEAL

## (undated) DEVICE — PINNACLE INTRODUCER SHEATH: Brand: PINNACLE

## (undated) DEVICE — TREK CORONARY DILATATION CATHETER 2.50 MM X 15 MM / RAPID-EXCHANGE: Brand: TREK

## (undated) DEVICE — SOLIDIFIER LIQUI LOC PLUS 2000CC

## (undated) DEVICE — KT VLV HEMO MAP ACC PLS LG/BORE MTL/INTRO

## (undated) DEVICE — Device: Brand: MEDEX